# Patient Record
Sex: FEMALE | Race: WHITE | NOT HISPANIC OR LATINO | URBAN - METROPOLITAN AREA
[De-identification: names, ages, dates, MRNs, and addresses within clinical notes are randomized per-mention and may not be internally consistent; named-entity substitution may affect disease eponyms.]

---

## 2020-05-12 ENCOUNTER — OFFICE VISIT - HEALTHEAST (OUTPATIENT)
Dept: FAMILY MEDICINE | Facility: CLINIC | Age: 60
End: 2020-05-12

## 2020-05-12 DIAGNOSIS — S82.831A CLOSED FRACTURE OF DISTAL END OF RIGHT FIBULA, UNSPECIFIED FRACTURE MORPHOLOGY, INITIAL ENCOUNTER: ICD-10-CM

## 2020-05-12 DIAGNOSIS — S90.01XA CONTUSION OF RIGHT ANKLE, INITIAL ENCOUNTER: ICD-10-CM

## 2020-06-09 ENCOUNTER — OFFICE VISIT - HEALTHEAST (OUTPATIENT)
Dept: FAMILY MEDICINE | Facility: CLINIC | Age: 60
End: 2020-06-09

## 2020-06-09 DIAGNOSIS — S82.831A CLOSED FRACTURE OF DISTAL END OF RIGHT FIBULA, UNSPECIFIED FRACTURE MORPHOLOGY, INITIAL ENCOUNTER: ICD-10-CM

## 2020-10-05 ENCOUNTER — COMMUNICATION - HEALTHEAST (OUTPATIENT)
Dept: SCHEDULING | Facility: CLINIC | Age: 60
End: 2020-10-05

## 2021-06-04 VITALS
SYSTOLIC BLOOD PRESSURE: 129 MMHG | HEART RATE: 78 BPM | DIASTOLIC BLOOD PRESSURE: 62 MMHG | WEIGHT: 120.9 LBS | OXYGEN SATURATION: 98 %

## 2021-06-04 VITALS
WEIGHT: 123.6 LBS | OXYGEN SATURATION: 97 % | HEART RATE: 88 BPM | SYSTOLIC BLOOD PRESSURE: 118 MMHG | DIASTOLIC BLOOD PRESSURE: 62 MMHG

## 2021-06-08 NOTE — PROGRESS NOTES
ASSESSMENT/PLAN:       1. Closed fracture of distal end of right fibula, unspecified fracture morphology, initial encounter related to accident       2. Contusion of right ankle, initial encounter     - XR Ankle Right 3 or More VWS, minimally displaced fracture of the distal fibula with some early new bone growth.  We talked to the patient about options and particularly since she has been walking on it for 2 weeks we decided to go ahead with a cam boot full-length and have her wear that at all times except when showering and sleeping.  She is hoping to get back to riding her horse as soon as possible but she is aware that this will likely take 4 additional weeks to heal.    I would like to see the patient back in about 4 weeks for a recheck        Alfonso More MD      PROGRESS NOTE   5/12/2020    SUBJECTIVE:  Manasa Robbins is a 59 y.o. female  who presents for   Chief Complaint   Patient presents with     Ankle Pain     ankle pain from being kicked by a horse 2 weeks ago, pain is getting worse, hard to walk     The patient is new to our clinic and recently moved here from [place].  She has family here in the area.  She has a great deal of interest in her hobby of riding horses.  While in [place] 2 weeks ago her horse kicked her right leg just above the ankle which was very painful and became very swollen and bruised.  She is bearing weight on it but her gait is still not normal and there is still swelling and pain over the distal fibula.  She has never had a history of fractures.  She is not been taking anything for pain.      There is no problem list on file for this patient.      No current outpatient medications on file.     No current facility-administered medications for this visit.        Social History     Tobacco Use   Smoking Status Never Smoker   Smokeless Tobacco Never Used           OBJECTIVE:        No results found for this or any previous visit (from the past 240 hour(s)).    Vitals:     05/12/20 1306   BP: 118/62   Pulse: 88   SpO2: 97%   Weight: 123 lb 9.6 oz (56.1 kg)     Weight: 123 lb 9.6 oz (56.1 kg)          Physical Exam:  GENERAL APPEARANCE: Very pleasant 59-year-old female, NAD, well hydrated, well nourished  SKIN:  Normal skin turgor, no lesions/rashes   EXTREMITY: Inspection of the right lower leg and ankle reveals some swelling over the distal fibula and there is tenderness in that area with compression.  The patient has fairly good range of motion of the ankle and there is no swelling or tenderness in the foot.  The patient walks with a slightly antalgic gait.  NEURO: no focal findings

## 2021-06-08 NOTE — PROGRESS NOTES
ASSESSMENT/PLAN:       1. Closed fracture of distal end of right fibula, unspecified fracture morphology, initial encounter     - XR Ankle Right 3 or More VWS, repeat x-ray shows good callus formation with a slight step-off that is minimal.  This likely occurred when she twisted her ankle about a week after I had seen her.  Reviewed with her exercises to does improve range of motion and strength in the muscles around the ankle.  I suggested that she get a Aircast which she will get online and wear that for at least 2 more weeks.  I think it is okay for her to start riding horse.  She wears good boots when she is riding horse.  I am not anticipating any follow-up but if in 2-3 beats she is not back to walking on a limp or her as another injury occur then she will need to be seen again.        Alfonso More MD      PROGRESS NOTE   6/9/2020    SUBJECTIVE:  Manasa Robbins is a 59 y.o. female  who presents for   Chief Complaint   Patient presents with     Follow-up     Right ankle, ankle is still sore but feeling better      Patient is here for 4-week follow-up on her fractured fibula.  She wore the cam boot for about 3 weeks and has had it off now for about the last 4 days.  About a week into using the cam boot she was walking in the house without it on and stepped on an uneven surface causing an inversion type of injury to her right ankle.  It took her about a week to recover from that.  Since she is not been using the cam boot she is trying to work on range of motion and strength but does still walk with a limp.  She is hoping to resume riding horse.    There is no problem list on file for this patient.      No current outpatient medications on file.     No current facility-administered medications for this visit.        Social History     Tobacco Use   Smoking Status Never Smoker   Smokeless Tobacco Never Used           OBJECTIVE:        No results found for this or any previous visit (from the past 240  hour(s)).    Vitals:    06/09/20 0935   BP: 129/62   Pulse: 78   SpO2: 98%   Weight: 120 lb 14.4 oz (54.8 kg)     Weight: 120 lb 14.4 oz (54.8 kg)          Physical Exam:  GENERAL APPEARANCE: 59-year-old female, NAD, well hydrated, well nourished  SKIN:  Normal skin turgor, no lesions/rashes      EXTREMITY: no edema and full ROM of all joints, the swelling is down significantly but she still does have some compression tenderness in the region of the fracture site.  There is no deformity and the patient still does walk with an antalgic gait.  She has difficulty going up on her forefoot and her heel to bear weight.  NEURO: no focal findings

## 2021-06-12 NOTE — TELEPHONE ENCOUNTER
Patient calling, and reporting,   Has corona virus.  Positive test Saturday.10/03/2020   Nauseous and sick she reports.  Kidney pain is very bad.   Lower back pain Diarrhea is bad. She reports, 3 stools per day.    No shortness of breath  No fever.  Patient reports;   Nausea. Is terrible.    Patient advised to go to the ER @ .  For what she calls is serious pain, and not being controlled. She reports she is not urinating normally, because she cannot drink due to nausea, and state she  Is concerned she may  Have a kidney infection.    SEVERE-TO-CRITICAL ILLNESS OR MILDLY IMMUNOCOMPROMISED:    Following criteria for mildly immunocompromised patients OR those with severe-to-critical COVID-19 illness,  patient meets criteria for discontinuation of Special Precautions:    FOR SYMPTOMATIC - 20 days following symptom onset, >24 hr fever free without fever-reducing meds, AND substantial improvement in COVID-19 symptoms.    FOR ASYMPTOMATIC - 20 days from positive test AND no COVID-19 symptom development in 20-day timeframe.         Arlet Reddy RN  [facility] Triage/refill nurse        Reason for Disposition    Patient sounds very sick or weak to the triager    Additional Information    Negative: SEVERE difficulty breathing (e.g., struggling for each breath, speaks in single words)    Negative: Difficult to awaken or acting confused (e.g., disoriented, slurred speech)    Negative: Bluish (or gray) lips or face now    Negative: Shock suspected (e.g., cold/pale/clammy skin, too weak to stand, low BP, rapid pulse)    Negative: Sounds like a life-threatening emergency to the triager    Negative: [1] COVID-19 exposure AND [2] no symptoms    Negative: COVID-19 and Breastfeeding, questions about    Negative: [1] Adult with possible COVID-19 symptoms AND [2] triager concerned about severity of symptoms or other causes    Negative: SEVERE or constant chest pain or pressure (Exception: mild central chest pain, present only  when coughing)    Negative: MODERATE difficulty breathing (e.g., speaks in phrases, SOB even at rest, pulse 100-120)    Protocols used: CORONAVIRUS (COVID-19) DIAGNOSED OR [id number] 8.4.20